# Patient Record
Sex: MALE | Race: ASIAN | NOT HISPANIC OR LATINO | URBAN - METROPOLITAN AREA
[De-identification: names, ages, dates, MRNs, and addresses within clinical notes are randomized per-mention and may not be internally consistent; named-entity substitution may affect disease eponyms.]

---

## 2024-08-11 ENCOUNTER — EMERGENCY (EMERGENCY)
Facility: HOSPITAL | Age: 69
LOS: 1 days | Discharge: HOME | End: 2024-08-11
Attending: PERSONAL EMERGENCY RESPONSE ATTENDANT
Payer: MEDICARE

## 2024-08-11 VITALS
DIASTOLIC BLOOD PRESSURE: 84 MMHG | RESPIRATION RATE: 19 BRPM | SYSTOLIC BLOOD PRESSURE: 154 MMHG | HEART RATE: 87 BPM | OXYGEN SATURATION: 98 %

## 2024-08-11 VITALS
WEIGHT: 149.91 LBS | SYSTOLIC BLOOD PRESSURE: 161 MMHG | OXYGEN SATURATION: 98 % | RESPIRATION RATE: 16 BRPM | DIASTOLIC BLOOD PRESSURE: 85 MMHG | HEART RATE: 81 BPM | HEIGHT: 62 IN | TEMPERATURE: 98 F

## 2024-08-11 PROCEDURE — 99283 EMERGENCY DEPT VISIT LOW MDM: CPT

## 2024-08-11 RX ORDER — AMLODIPINE BESYLATE 2.5 MG/1
5 TABLET ORAL ONCE
Refills: 0 | Status: COMPLETED | OUTPATIENT
Start: 2024-08-11 | End: 2024-08-11

## 2024-08-11 RX ADMIN — AMLODIPINE BESYLATE 5 MILLIGRAM(S): 2.5 TABLET ORAL at 10:18

## 2024-08-11 NOTE — ED PROVIDER NOTE - PATIENT PORTAL LINK FT
You can access the FollowMyHealth Patient Portal offered by [facility] by registering at the following website: [email or web address]. By joining [employer]’s FollowMyHealth portal, you will also be able to view your health information using other applications (apps) compatible with our system.

## 2024-08-11 NOTE — ED PROVIDER NOTE - CLINICAL SUMMARY MEDICAL DECISION MAKING FREE TEXT BOX
Patient is a 69-year-old male past medical history hypertension coming in for hypertension. With initial vital signs significant for blood pressure 140s over 80s, otherwise within normal limits.  Presenting for hypertension in the setting of nervous setting and not taking antihypertensive this morning.  Without any clinical findings suggestive of endorgan ischemia.  To give oral antihypertensives and discharge.  Likely patient will be able to obtain his antihypertensives at home later today. Patient is a 69-year-old male past medical history hypertension coming in for hypertension. With initial vital signs significant for blood pressure 140s over 80s, otherwise within normal limits.  Presenting for hypertension in the setting of nervous setting and not taking antihypertensive this morning.  Without any clinical findings suggestive of endorgan ischemia.  To give oral antihypertensives and discharge.  Likely patient will be able to obtain his antihypertensives at home later today.    Attending MD Dumont.  Agree with above.  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  Pt denies CP/SOB/light-headedness. Pt well appearing.  Denies complaints.  Planned PO home meds and referral for outpt f/u.  Pt to be released and return home today where he has his home meds.

## 2024-08-11 NOTE — ED PROVIDER NOTE - OBJECTIVE STATEMENT
Patient is a 69-year-old male past medical history hypertension coming in for hypertension.  Patient was at court today, feeling anxious because of court, was found to have a blood pressure with systolic 167, diastolic in 90s.  Says that he has not taken his antihypertensive medications but does not member which ones.  Otherwise without complaint including headache, vision changes, chest pain, shortness of breath.  Has otherwise been feeling well.  Upon calling his daughter, takes amlodipine 5 mg daily and was unable to take it today.  Per officer with patient, patient is likely to be returned home today following arraignment.

## 2024-08-11 NOTE — ED PROVIDER NOTE - PHYSICAL EXAMINATION
CONSTITUTIONAL: awake; in no acute distress.   HEAD: Normocephalic; atraumatic.  EYES: no conjunctival injection. no scleral icterus  ENT: No nasal discharge; airway clear.  CARD: S1, S2 normal; no murmurs, gallops, or rubs. Regular rate and rhythm.   RESP: No wheezes, rales or rhonchi. Good air movement bilaterally.   ABD: soft ntnd, no guarding, no distention, no rigidity.   EXT:  No cyanosis or edema.   NEURO: Alert, oriented, grossly unremarkable  PSYCH: Cooperative, appropriate.

## 2024-08-11 NOTE — ED PROVIDER NOTE - NSFOLLOWUPINSTRUCTIONS_ED_ALL_ED_FT
You were seen in the ED for high blood pressure    Your evaluation showed no findings requiring further evaluation or treatment in the hospital at this time.    Please follow up with your PCP as discussed within 1 week. Discuss your symptoms, medications, and results in this packet.  Please take your blood pressure medications as directed.    Seek immediate medical attention if you experience new or worsening symptoms.

## 2024-08-11 NOTE — ED ADULT NURSE NOTE - NSFALLUNIVINTERV_ED_ALL_ED
Bed/Stretcher in lowest position, wheels locked, appropriate side rails in place/Call bell, personal items and telephone in reach/Instruct patient to call for assistance before getting out of bed/chair/stretcher/Non-slip footwear applied when patient is off stretcher/[place] to call system/Physically safe environment - no spills, clutter or unnecessary equipment/Purposeful proactive rounding/Room/bathroom lighting operational, light cord in reach

## 2024-08-11 NOTE — ED ADULT NURSE NOTE - OBJECTIVE STATEMENT
Pt 69y M PMH HTN brought in by [facility] for HTN. Pt was to get arraigned today however had elevated bp systolic in 160s and was not cleared for court. Pt brought in to ED. Pt denies any complaints. Pt denies ha, dizziness. Pt well appearing, stated he was anxious at court. Pt was arrested yesterday and had not taken his medication. Pt is A&Ox4, breathing unlabored and spontaneous, full and ROM all extremities. Pt resting in stretcher, bed in lowest position, [facility] officers at bedside, pt under arrest, aware of plan of care. Comfort and safety measures maintained.

## 2024-08-11 NOTE — ED PROVIDER NOTE - ADDITIONAL NOTES AND INSTRUCTIONS:
Patient is medically cleared for return to court activity following evaluation in emergency department for hypertension (high blood pressure) without limitation.

## 2024-08-11 NOTE — ED PROVIDER NOTE - ATTENDING CONTRIBUTION TO CARE
Attending MD Dumont:  I performed a history and physical exam of the patient and discussed their management with the resident. I reviewed the resident's note and agree with the documented findings and plan of care. My medical decision making and observations are found above.